# Patient Record
Sex: FEMALE | Race: WHITE | Employment: FULL TIME | URBAN - METROPOLITAN AREA
[De-identification: names, ages, dates, MRNs, and addresses within clinical notes are randomized per-mention and may not be internally consistent; named-entity substitution may affect disease eponyms.]

---

## 2021-01-03 ENCOUNTER — APPOINTMENT (OUTPATIENT)
Dept: CT IMAGING | Age: 56
End: 2021-01-03
Attending: EMERGENCY MEDICINE
Payer: COMMERCIAL

## 2021-01-03 ENCOUNTER — APPOINTMENT (OUTPATIENT)
Dept: GENERAL RADIOLOGY | Age: 56
End: 2021-01-03
Attending: EMERGENCY MEDICINE
Payer: COMMERCIAL

## 2021-01-03 ENCOUNTER — HOSPITAL ENCOUNTER (EMERGENCY)
Age: 56
Discharge: HOME | End: 2021-01-04
Attending: EMERGENCY MEDICINE | Admitting: EMERGENCY MEDICINE
Payer: COMMERCIAL

## 2021-01-03 DIAGNOSIS — S42.215A CLOSED NONDISPLACED FRACTURE OF SURGICAL NECK OF LEFT HUMERUS, UNSPECIFIED FRACTURE MORPHOLOGY, INITIAL ENCOUNTER: Primary | ICD-10-CM

## 2021-01-03 DIAGNOSIS — S01.112A LEFT EYELID LACERATION, INITIAL ENCOUNTER: ICD-10-CM

## 2021-01-03 DIAGNOSIS — Z23 NEED FOR TETANUS BOOSTER: ICD-10-CM

## 2021-01-03 DIAGNOSIS — M79.602 LEFT ARM PAIN: ICD-10-CM
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
## 2021-01-03 LAB
GLUCOSE BLD STRIP.AUTO-MCNC: 98 MG/DL (ref 65–100)
SERVICE CMNT-IMP: NORMAL

## 2021-01-03 PROCEDURE — 70486 CT MAXILLOFACIAL W/O DYE: CPT

## 2021-01-03 PROCEDURE — 82962 GLUCOSE BLOOD TEST: CPT

## 2021-01-03 PROCEDURE — 73030 X-RAY EXAM OF SHOULDER: CPT

## 2021-01-03 PROCEDURE — 70450 CT HEAD/BRAIN W/O DYE: CPT

## 2021-01-03 PROCEDURE — 99283 EMERGENCY DEPT VISIT LOW MDM: CPT

## 2021-01-03 PROCEDURE — 90471 IMMUNIZATION ADMIN: CPT

## 2021-01-03 PROCEDURE — 74011250637 HC RX REV CODE- 250/637: Performed by: EMERGENCY MEDICINE

## 2021-01-03 PROCEDURE — 72125 CT NECK SPINE W/O DYE: CPT

## 2021-01-03 PROCEDURE — 75810000293 HC SIMP/SUPERF WND  RPR

## 2021-01-03 PROCEDURE — 74011000250 HC RX REV CODE- 250: Performed by: EMERGENCY MEDICINE

## 2021-01-03 RX ORDER — LIDOCAINE HYDROCHLORIDE AND EPINEPHRINE 20; 5 MG/ML; UG/ML
1.5 INJECTION, SOLUTION EPIDURAL; INFILTRATION; INTRACAUDAL; PERINEURAL
Status: COMPLETED | OUTPATIENT
Start: 2021-01-03 | End: 2021-01-03

## 2021-01-03 RX ORDER — OXYCODONE AND ACETAMINOPHEN 5; 325 MG/1; MG/1
1 TABLET ORAL
Qty: 10 TAB | Refills: 0 | Status: SHIPPED | OUTPATIENT
Start: 2021-01-03 | End: 2021-01-04 | Stop reason: SDUPTHER

## 2021-01-03 RX ORDER — OXYCODONE AND ACETAMINOPHEN 5; 325 MG/1; MG/1
1 TABLET ORAL
Status: COMPLETED | OUTPATIENT
Start: 2021-01-03 | End: 2021-01-03

## 2021-01-03 RX ORDER — BACITRACIN 500 [USP'U]/G
OINTMENT TOPICAL 3 TIMES DAILY
Qty: 1 TUBE | Refills: 0 | Status: SHIPPED | OUTPATIENT
Start: 2021-01-03 | End: 2021-01-13

## 2021-01-03 RX ADMIN — OXYCODONE HYDROCHLORIDE AND ACETAMINOPHEN 1 TABLET: 5; 325 TABLET ORAL at 23:48

## 2021-01-03 RX ADMIN — LIDOCAINE HYDROCHLORIDE AND EPINEPHRINE 30 MG: 20; 5 INJECTION, SOLUTION EPIDURAL; INFILTRATION; INTRACAUDAL; PERINEURAL at 23:48

## 2021-01-03 NOTE — LETTER
[address] 
[facility] EMERGENCY DEPT 
[address] [place] [address] Misty Houser [id number] 
[phone] Work/School Note Date: 1/3/2021 To Whom It May concern: 
 
Vicente Deluca was seen and treated today in the emergency room by the following provider(s): 
Attending Provider: Petra Donahue MD.   
 
Vicente Deluca may return to work on 1/8/21. Sincerely, Tito Cisneros MD

## 2021-01-04 ENCOUNTER — OFFICE VISIT (OUTPATIENT)
Dept: ORTHOPEDIC SURGERY | Age: 56
End: 2021-01-04
Payer: COMMERCIAL

## 2021-01-04 VITALS — SYSTOLIC BLOOD PRESSURE: 123 MMHG | HEART RATE: 80 BPM | DIASTOLIC BLOOD PRESSURE: 78 MMHG | TEMPERATURE: 97.1 F

## 2021-01-04 VITALS
OXYGEN SATURATION: 99 % | RESPIRATION RATE: 18 BRPM | BODY MASS INDEX: 43.04 KG/M2 | HEIGHT: 61 IN | SYSTOLIC BLOOD PRESSURE: 128 MMHG | DIASTOLIC BLOOD PRESSURE: 73 MMHG | WEIGHT: 227.96 LBS | HEART RATE: 88 BPM | TEMPERATURE: 97.6 F

## 2021-01-04 DIAGNOSIS — S42.295A OTHER CLOSED NONDISPLACED FRACTURE OF PROXIMAL END OF LEFT HUMERUS, INITIAL ENCOUNTER: Primary | ICD-10-CM

## 2021-01-04 PROBLEM — S42.202A CLOSED FRACTURE OF LEFT PROXIMAL HUMERUS: Status: ACTIVE | Noted: 2021-01-04

## 2021-01-04 PROCEDURE — 75810000293 HC SIMP/SUPERF WND  RPR

## 2021-01-04 PROCEDURE — 74011250636 HC RX REV CODE- 250/636: Performed by: EMERGENCY MEDICINE

## 2021-01-04 PROCEDURE — 99203 OFFICE O/P NEW LOW 30 MIN: CPT | Performed by: ORTHOPAEDIC SURGERY

## 2021-01-04 PROCEDURE — 90715 TDAP VACCINE 7 YRS/> IM: CPT | Performed by: EMERGENCY MEDICINE

## 2021-01-04 RX ORDER — ONDANSETRON 4 MG/1
4 TABLET, ORALLY DISINTEGRATING ORAL
Qty: 20 TAB | Refills: 0 | Status: SHIPPED | OUTPATIENT
Start: 2021-01-04

## 2021-01-04 RX ORDER — OXYCODONE AND ACETAMINOPHEN 5; 325 MG/1; MG/1
1 TABLET ORAL
Qty: 15 TAB | Refills: 0 | Status: SHIPPED | OUTPATIENT
Start: 2021-01-04 | End: 2021-01-09

## 2021-01-04 RX ADMIN — TETANUS TOXOID, REDUCED DIPHTHERIA TOXOID AND ACELLULAR PERTUSSIS VACCINE, ADSORBED 0.5 ML: 5; 2.5; 8; 8; 2.5 SUSPENSION INTRAMUSCULAR at 00:19

## 2021-01-04 NOTE — ED PROVIDER NOTES
EMERGENCY DEPARTMENT HISTORY AND PHYSICAL EXAM      Please note that this dictation was completed with the assistance of \"Dragon\", the computer voice recognition software. Quite often unanticipated grammatical, syntax, homophones, and other interpretive errors are inadvertently transcribed by the computer software. Please disregard these errors and any errors that have escaped final proofreading. Thank you. Patient Name: Maria Rincon  : 1965  MRN: [id number]  History of Presenting Illness     Chief Complaint   Patient presents with    Fall     Pt reports she fell in bathroom-- hit head on sink--Pt reports left shoulder painDenies LOC. Denies being on any blood thinners.  Laceration     Pt has a laceration above left eye. Pts daughter reports Pt ha sbeen drinking tonight. History Provided By: Patient    HPI: Maria Rincon, 48 y.o. female with past medical history as documented below presents to the ED with c/o of low fall occurring earlier this evening. Patient states that she was walking to the bathroom when she slipped on a puddle of water and hit her head. Patient states that there was no loss of consciousness. Patient reports pain is mostly in her left shoulder. She states it is moderate to severe, worse with any movement. Patient also noticed a laceration above her left eye. She is unsure of her last tetanus. She states there was bleeding initially that was controlled with pressure. Patient admits to drinking earlier this evening. Patient denies any preceding symptoms of chest pain, shortness of breath or palpitations. Pt denies any other alleviating or exacerbating factors. Additionally, pt specifically denies any recent fever, chills, headache, nausea, vomiting, abdominal pain, CP, SOB, lightheadedness, dizziness, numbness, weakness, lower extremity swelling, heart palpitations, urinary sxs, diarrhea, constipation, melena, hematochezia, cough, or congestion.      There are no other complaints, changes or physical findings pertinent to the HPI at this time. PCP: None    Past History   Past Medical History:  Past Medical History:   Diagnosis Date    Abdominal pain     Fatigue     Hernia of abdominal cavity     incisional    Musculoskeletal disorder     muscle aches    Vertigo        Past Surgical History:  Past Surgical History:   Procedure Laterality Date    ABDOMEN SURGERY PROC UNLISTED      gastric bypass    HX GASTRIC BYPASS      gasstric bypass  /  gall bladder removed       Family History:  Family History   Problem Relation Age of Onset    Heart Disease Father        Social History:  Social History     Tobacco Use    Smoking status: Former Smoker     Quit date: 2015     Years since quittin.0   Substance Use Topics    Alcohol use: No    Drug use: Yes     Types: Prescription, OTC       Allergies:  No Known Allergies    Current Medications:  No current facility-administered medications on file prior to encounter. Current Outpatient Medications on File Prior to Encounter   Medication Sig Dispense Refill    oxyCODONE-acetaminophen (PERCOCET) 5-325 mg per tablet Take 1-2 Tabs by mouth every four (4) hours as needed for Pain. Max Daily Amount: 12 Tabs. 50 Tab 0     Review of Systems   Review of Systems   Constitutional: Negative. Negative for chills and fever. HENT: Negative. Negative for congestion, facial swelling, rhinorrhea, sore throat, trouble swallowing and voice change. Eyes: Negative. Respiratory: Negative. Negative for apnea, cough, chest tightness, shortness of breath and wheezing. Cardiovascular: Negative. Negative for chest pain, palpitations and leg swelling. Gastrointestinal: Negative. Negative for abdominal distention, abdominal pain, blood in stool, constipation, diarrhea, nausea and vomiting. Endocrine: Negative. Negative for cold intolerance, heat intolerance and polyuria. Genitourinary: Negative.   Negative for difficulty urinating, dysuria, flank pain, frequency, hematuria and urgency. Musculoskeletal: Positive for arthralgias. Negative for back pain, myalgias, neck pain and neck stiffness. Skin: Positive for wound. Negative for color change and rash. Neurological: Negative. Negative for dizziness, syncope, facial asymmetry, speech difficulty, weakness, light-headedness, numbness and headaches. Hematological: Negative. Does not bruise/bleed easily. Psychiatric/Behavioral: Negative. Negative for confusion and self-injury. The patient is not nervous/anxious. Physical Exam   Physical Exam  Vitals signs and nursing note reviewed. Constitutional:       General: She is not in acute distress. Appearance: She is well-developed. She is not diaphoretic. HENT:      Head: Normocephalic and atraumatic. Comments: Approximately 3 cm linear laceration noted to the left upper brow, no involvement of the canicular system, no periorbital edema or ecchymosis. No septal hematoma, midface is stable, no malocclusion     Mouth/Throat:      Pharynx: No oropharyngeal exudate. Eyes:      Conjunctiva/sclera: Conjunctivae normal.      Pupils: Pupils are equal, round, and reactive to light. Neck:      Musculoskeletal: Normal range of motion. Cardiovascular:      Rate and Rhythm: Normal rate and regular rhythm. Heart sounds: Normal heart sounds. No murmur. No friction rub. No gallop. Pulmonary:      Effort: Pulmonary effort is normal. No respiratory distress. Breath sounds: Normal breath sounds. No wheezing or rales. Chest:      Chest wall: No tenderness. Abdominal:      General: Bowel sounds are normal. There is no distension. Palpations: Abdomen is soft. There is no mass. Tenderness: There is no abdominal tenderness. There is no guarding or rebound. Musculoskeletal: Normal range of motion.          General: Tenderness (Tenderness to palpation of the left shoulder and left proximal humerus, neurovascular intact distally. No tenderness over the clavicle,) present. No deformity. Skin:     General: Skin is warm. Findings: No rash. Neurological:      Mental Status: She is alert and oriented to person, place, and time. Cranial Nerves: No cranial nerve deficit. Motor: No abnormal muscle tone. Coordination: Coordination normal.      Deep Tendon Reflexes: Reflexes normal.         Diagnostic Study Results     Labs -   I have personally reviewed and interpreted all laboratory results. Recent Results (from the past 24 hour(s))   GLUCOSE, POC    Collection Time: 01/03/21 11:42 PM   Result Value Ref Range    Glucose (POC) 98 65 - 100 mg/dL    Performed by Rhonda Giordano (PCT)        Radiologic Studies -   I have personally reviewed and interpreted all imaging studies and agree with radiology interpretation and report. XR SHOULDER LT AP/LAT MIN 2 V   Final Result   IMPRESSION: Acute nondisplaced left humeral neck fracture. CT HEAD WO CONT   Final Result   IMPRESSION:       No acute intracranial abnormality. CT MAXILLOFACIAL WO CONT   Final Result   IMPRESSION:    No acute abnormality. CT SPINE CERV WO CONT   Final Result   IMPRESSION:    No acute abnormality. CT Results  (Last 48 hours)               01/03/21 2208  CT HEAD WO CONT Final result    Impression:  IMPRESSION:        No acute intracranial abnormality. Narrative:  EXAM:  CT head without contrast       INDICATION: Fall with head injury. COMPARISON: None       TECHNIQUE: Noncontrast head CT. Coronal and sagittal reformats. CT dose   reduction was achieved through use of a standardized protocol tailored for this   examination and automatic exposure control for dose modulation. FINDINGS: The ventricles and sulci are age-appropriate without hydrocephalus. There is no mass effect or midline shift. There is no intracranial hemorrhage or   extra-axial fluid collection. There is no abnormal parenchymal attenuation. The   gray-white matter differentiation is maintained. The basal cisterns are patent. The osseous structures are intact. The visualized paranasal sinuses and mastoid   air cells are clear. 01/03/21 2208  CT MAXILLOFACIAL WO CONT Final result    Impression:  IMPRESSION:    No acute abnormality. Narrative:  EXAM:  CT maxillofacial without contrast       INDICATION:  Fall with facial injury. COMPARISON: None       TECHNIQUE: Helical CT of the facial bones with coronal and sagittal reformats. Images reviewed in soft tissue and bone windows. CT dose reduction was achieved   through use of a standardized protocol tailored for this examination and   automatic exposure control for dose modulation. CONTRAST: None. FINDINGS:    There is no acute fracture. The nasal septum is midline. The temporomandibular   joints are intact. There is mild mucosal thickening in the right anterior ethmoid air cells. The   remaining paranasal sinuses and visualized mastoid air cells are clear. Limited intracranial images are unremarkable. The globes and orbits are   symmetric and within normal limits. 01/03/21 2208  CT SPINE CERV WO CONT Final result    Impression:  IMPRESSION:    No acute abnormality. Narrative:  EXAM:  CT C-spine without contrast       INDICATION: Fall with head injury. COMPARISON: None. TECHNIQUE: Thin section axial noncontrast CT of the cervical spine with coronal   and sagittal reformats. CT dose reduction was achieved through use of a   standardized protocol tailored for this examination and automatic exposure   control for dose modulation. FINDINGS:    There is no acute fracture or subluxation. Vertebral body heights and   intervertebral disc spaces are maintained. There is no abnormality in alignment. There is no spinal canal or foraminal stenosis.  The paraspinal soft tissues are unremarkable. The visualized lung apices are clear. CXR Results  (Last 48 hours)    None          Medical Decision Making   I reviewed the vital signs, available nursing notes, past medical history, past surgical history, family history and social history. Vital Signs-Reviewed the patient's vital signs. Patient Vitals for the past 24 hrs:   Temp Pulse Resp BP SpO2   01/04/21 0015  88 18 128/73 99 %   01/03/21 2043 97.6 °F (36.4 °C) 84 18 138/86 99 %       Pulse Oximetry Analysis - 99% on RA    Cardiac Monitor:   Rate: 88 bpm  Rhythm: Normal Sinus Rhythm      Records Reviewed: Nursing Notes, Old Medical Records, Previous electrocardiograms, Previous Radiology Studies and Previous Laboratory Studies    Provider Notes (Medical Decision Making):   Patient presents after fall with left shoulder pain. Will get imaging to further evaluate for fracture vs. Dislocation vs. Contusion. Will treat with analgesics at this time and continue to monitor for changes in mentation. I have discussed with the patient how to reduce likelihood of falling at home by removing throw rugs, loose wires or other objects from floor, installing hand-rails in bathrooms, tubs and halls, and leaving some lights on at night. Pt presents with resultant laceration of her left eyebrow requiring simple bedside repair. TDAP updated. NVI distally. Relatively clean wound, irrigated copiously and repaired in simple fashion with Dermabond and steristrips. See procedure note below. No antibiotics indicated at this time. NVI per routine post repair. No overt e/o compartment syndrome. Discussed strict return precautions, follow up PRN and wound care. Pt understands and agrees with above plan. ED Course:   I am the first provider for this patient's ED visit today. Initial assessment performed.  I discussed presenting problems, concerns and my formulated plan for today's visit with the patient and any available family members at bedside. I encouraged them to ask questions as they arise throughout the visit. ALCOHOL/SUBSTANCE ABUSE COUNSELING:  Upon evaluation, pt endorsed recent alcohol/illicit drug use. For approximately 7 minutes, pt has been counseled on the dangers of alcohol and illicit drug use on their health, and they were encouraged to quit as soon as possible in order to decrease further risks to their health. Pt has conveyed their understanding of the risks involved should they continue to use these products. I reviewed our electronic medical record system for any past medical records that were available that may contribute to the patient's current condition, the nursing notes and vital signs from today's visit. Misti Carney MD    ED Orders Placed :  Orders Placed This Encounter    WOUND REPAIR    XR SHOULDER LT AP/LAT MIN 2 V    CT HEAD WO CONT    CT MAXILLOFACIAL WO CONT    CT SPINE CERV WO CONT    GLUCOSE, POC    DURABLE MEDICAL EQUIPMENT     oxyCODONE-acetaminophen (PERCOCET) 5-325 mg per tablet 1 Tab    lidocaine-EPINEPHrine (PF) (XYLOCAINE) 2 %-1:200,000 injection 30 mg    DISCONTD: oxyCODONE-acetaminophen (Percocet) 5-325 mg per tablet    bacitracin (BACITRACIN) 500 unit/gram oint    diph,Pertuss(AC),Tet Vac-PF (BOOSTRIX) suspension 0.5 mL    oxyCODONE-acetaminophen (Percocet) 5-325 mg per tablet       ED Medications Administered:  Medications   oxyCODONE-acetaminophen (PERCOCET) 5-325 mg per tablet 1 Tab (1 Tab Oral Given 1/3/21 2348)   lidocaine-EPINEPHrine (PF) (XYLOCAINE) 2 %-1:200,000 injection 30 mg (30 mg IntraDERMal Given 1/3/21 2348)   diph,Pertuss(AC),Tet Vac-PF (BOOSTRIX) suspension 0.5 mL (0.5 mL IntraMUSCular Given 1/4/21 0019)         Procedure Note - Laceration Repair:  12:00 AM  Procedure by Millicent Griffin MD.  Complexity: simple  3 cm linear laceration to left upper brow was irrigated copiously with NS under jet lavage, prepped with Chlorprep and draped in a sterile fashion.   The area was anesthetized via local infiltration of 1 mL lidocaine 1% without epinephrine. The wound was explored with the following results: No foreign bodies found, No tendon laceration seen. The wound was repaired with Dermabond and steri-strips. The wound was closed with good hemostasis and approximation. Sterile dressing applied. Estimated blood loss: none  The procedure took 1-15 minutes, and pt tolerated well. Procedure Note - Sling and Swathe Splint Placement:  Performed by: Pema Thornton MD  Neurovascularly intact prior to tx. A shoulder sling and swathe splint was placed on pt's left shoulder/ arm. Neurovascularly intact after tx. The procedure took 5-10 minutes, and pt tolerated well. Progress Note:  Patient has been reassessed and reports feeling better and symptoms have improved significantly after ED treatment. Fransico Kaplan's final labs and imaging have been reviewed with her and available family and/or caregiver. They have been counseled regarding her diagnosis. She verbally conveys understanding and agreement of the signs, symptoms, diagnosis, treatment and prognosis and additionally agrees to follow up as recommended with Dr. None and/or specialist in 24 - 48 hours. She also agrees with the care-plan we created together and conveys that all of her questions have been answered. I have also put together some discharge instructions for her that include: 1) educational information regarding their diagnosis, 2) how to care for their diagnosis at home, as well a 3) list of reasons why they would want to return to the ED prior to their follow-up appointment should the patient's condition change or symptoms worsen. I have answered all questions to the patient's satisfaction. Strict return precautions given. She both understood and agreed with plan as discussed. Vital signs stable for discharge. Pt very appreciative of care today. Disposition:   DISCHARGE  The pt is ready for discharge.  The pt's signs, symptoms, diagnosis, and discharge instructions have been discussed and pt has conveyed their understanding. The pt is to follow up as recommended or return to ER should their symptoms worsen. Plan has been discussed and pt is in agreement. Plan:  1. Return precautions as discussed with patient and available family and/or caregiver. 2.   Discharge Medication List as of 1/3/2021 11:45 PM      START taking these medications    Details   bacitracin (BACITRACIN) 500 unit/gram oint Apply  to affected area three (3) times daily for 10 days. Apply to affected area, Normal, Disp-1 Tube, R-0      !! oxyCODONE-acetaminophen (Percocet) 5-325 mg per tablet Take 1 Tab by mouth every eight (8) hours as needed for Pain for up to 5 days. Max Daily Amount: 3 Tabs. Indications: pain, Normal, Disp-10 Tab, R-0       !! - Potential duplicate medications found. Please discuss with provider. CONTINUE these medications which have NOT CHANGED    Details   !! oxyCODONE-acetaminophen (PERCOCET) 5-325 mg per tablet Take 1-2 Tabs by mouth every four (4) hours as needed for Pain. Max Daily Amount: 12 Tabs., Print, Disp-50 Tab, R-0       !! - Potential duplicate medications found. Please discuss with provider. 3.   Follow-up Information     Follow up With Specialties Details Why Contact Info    [facility] EMERGENCY DEPT Emergency Medicine  As needed, If symptoms worsen [address] [place] [address]  [phone]    Skinny Avery MD Orthopedic Surgery  As needed, If symptoms worsen [address] [facility]  [address]  [address] [address] [phone]            Instructed to return to ED if worse  Diagnosis   Clinical Impression:  1. Closed nondisplaced fracture of surgical neck of left humerus, unspecified fracture morphology, initial encounter    2. Fall from ground level    3. Left eyelid laceration, initial encounter    4. Left arm pain    5.  Need for tetanus booster        Attestation:  Do Rooney Cassandra Shrestha MD, am the attending of record for this patient. I personally performed the services described in this documentation on this date, 1/3/2021 for patient, Jamie Mcneill. I have reviewed the chart and verified that the record is accurate and complete.

## 2021-01-04 NOTE — LETTER
1/4/2021 3:36 PM 
 
Ms. Ann Clemons [place] [address] To whom it may concern, Ann Clemons is under the care of [facility]. Ann Clemons will be released to work on 1/6/2021 with the following restrictions: no use left arm. If you have any questions or concerns, please feel free to contact my office.  
 
 
 
 
 
 
Sincerely, 
 
 
Pratik Hernandez, DO

## 2021-01-04 NOTE — ED NOTES
Post Fall Documentation      [place] witnessed/unwitnessed fall occurred on 1/3/21 (Date) at 0 (Time). The answers to the following questions summarize the fall: In the patient's own words,:  · What were you attempting to do when you fell? Go to the bathroom  · Do you know why you fell? I got dizzy  · Do you have any pain/discomfort or any other complaints? Not due to the fall  · Which part of your body made contact with the floor or other object? Buttocks/back    Nurse:  Renny Hernandez Was this an assisted fall? no   Was fall witnessed? No   If witnessed, what part of the body made contact with the floor or other object?  unwitnessed   Patients mental status after the fall/when found: Alert and oriented   Any apparent injury:  No apparent injury   Immediate interventions for injury/suspected injury? No interventions needed   Patient assisted back to bed? Assist X2   Name of provider notified and time, any comments? Mistyvernon Marc [phone]       Renny Hernandez Name of family member notified and time: Daugher 0      Immediate VS and physical assessment documented in flow sheets. Neuro assessment every hour x 4 (for potential head injury or unwitnessed fall) documented in flow sheets.       Jay Thomas, RN

## 2021-01-04 NOTE — ED NOTES
Bedside and Verbal shift change report given to [employer] (oncoming nurse) by Harish Huerta (offgoing nurse). Report included the following information SBAR, ED Summary, MAR and Recent Results.

## 2021-01-05 NOTE — PROGRESS NOTES
1/4/2021      CC: Left shoulder pain    HPI:      This is a 48y.o. year old female who is right hand dominant. The patient did have a fall onto the left side. The patient had the immediate onset of pain as well as difficulty moving the shoulder. Complains of pain in shoulder with ecchymosis and swelling. No numbness in hand or pain radiating into the hand. Does not complain of any open wounds. No loss of consciousness. No other musculoskeletal complaints. PMH:  Past Medical History:   Diagnosis Date    Abdominal pain     Fatigue     Hernia of abdominal cavity     incisional    Musculoskeletal disorder     muscle aches    Vertigo        PSxHx:  Past Surgical History:   Procedure Laterality Date    ABDOMEN SURGERY PROC UNLISTED  2002    gastric bypass    HX GASTRIC BYPASS      gasstric bypass  /  gall bladder removed       Meds:    Current Outpatient Medications:     oxyCODONE-acetaminophen (Percocet) 5-325 mg per tablet, Take 1 Tab by mouth every eight (8) hours as needed for Pain for up to 5 days. Max Daily Amount: 3 Tabs. Indications: pain, Disp: 15 Tab, Rfl: 0    ondansetron (Zofran ODT) 4 mg disintegrating tablet, Take 1 Tab by mouth every eight (8) hours as needed for Nausea or Vomiting for up to 20 doses. , Disp: 20 Tab, Rfl: 0    bacitracin (BACITRACIN) 500 unit/gram oint, Apply  to affected area three (3) times daily for 10 days. Apply to affected area, Disp: 1 Tube, Rfl: 0    oxyCODONE-acetaminophen (PERCOCET) 5-325 mg per tablet, Take 1-2 Tabs by mouth every four (4) hours as needed for Pain. Max Daily Amount: 12 Tabs., Disp: 50 Tab, Rfl: 0  No current facility-administered medications for this visit.      All:  No Known Allergies    Social Hx:  Social History     Socioeconomic History    Marital status: SINGLE     Spouse name: Not on file    Number of children: Not on file    Years of education: Not on file    Highest education level: Not on file   Tobacco Use    Smoking status: Former Smoker     Quit date: 2015     Years since quittin.0   Substance and Sexual Activity    Alcohol use: No    Drug use: Yes     Types: Prescription, OTC       Family Hx:  Family History   Problem Relation Age of Onset    Heart Disease Father          Review of Systems:       General: Denies headache, lethargy, fever, weight loss  Ears/Nose/Throat: Denies ear discharge, drainage, nosebleeds, hoarse voice, dental problems  Cardiovascular: Denies chest pain, shortness of breath  Lungs: Denies chest pain, breathing problems, wheezing, pneumonia  Stomach: Denies stomach pain, heartburn, constipation, irritable bowel  Skin: Denies rash, sores, open wounds  Musculoskeletal: Left shoulder pain  Genitourinary: Denies dysuria, hematuria, polyuria  Gastrointestinal: Denies constipation, obstipation, diarrhea  Neurological: Denies changes in sight, smell, hearing, taste, seizures. Denies loss of consciousness. Psychiatric: Denies depression, sleep pattern changes, anxiety, change in personality  Endocrine: Denies mood swings, heat or cold intolerance  Hematologic/Lymphatic: Denies anemia, purpura, petechia  Allergic/Immunologic: Denies swelling of throat, pain or swelling at lymph nodes      Physical Examination:    There were no vitals taken for this visit. General: AOX3, no apparent distress  Psychiatric: mood and affect appropriate  Lungs: breathing is symmetric and unlabored bilaterally  Heart: regular rate and rhythm  Abdomen: no guarding  Head: normocephalic, atraumatic  Skin: No significant abnormalities, good turgor  Sensation intact to light touch: C5-T1 dermatomes  Muscular exam: 5/5 strength in all major muscle groups unless noted in specialty exam.    Extremities      Right upper extremity: Full active and passive range of motion without pain, deformity, no open wound, strength 5/5 in all major muscle groups. Left upper extremity: No gross deformity or open wound.  Swelling is noted at the shoulder laterally with associated ecchymosis. Range of motion is limited secondary to pain at the shoulder. Elbow motion is limited by pain at the shoulder, but there is no tenderness. Fingers do move independently and formal strength testing was not performed secondary to pain at the shoulder, but no indication of tendinous deficit. Sensation is intact to light touch in the median, radial, and ulnar distributions. Capillary refill is less than 2 seconds in the fingers. Wrist is non-tender to palpation. Right lower extremity: Full active and passive range of motion without pain, deformity, no open wound, strength 5/5 in all major muscle groups. Left lower extremity:  Full active and passive range of motion without pain, deformity, no open wound, strength 5/5 in all major muscle groups. Diagnostics:    Pertinent Xrays:  Xrays are available of the left shoulder which indicate an extra-articular proximal humerus fracture. There are 2 parts. No other abnormalities. Assessment: Left extra-articular proximal humerus fracture    Plan:    In consideration of the nature of the fracture, pace of healing, reliability of good outcome, age of the patient and informed decision of the patient, we did discuss that this is a fracture which can be treated in a nonoperative fasion. We did discuss that there are risks to this particular type of treatment. Stiffness was discussed at length, and that outcomes are largely the same as surgery in this population. There is morbidity of immobilization, prolonged recovery, need for close follow up, potential for later and unacceptable displacement requiring surgery, stiffness, swelling, persistent pain, need for therapy, and skin issues. The patient did freely state their understanding and agreement.     The patient will be given an immobilizer for comfort, but gentle and directed range of motion exercises will begin as soon as pain allows, likely in a week.  I have warned that stiffness is one of the most common issues after these fractures, and early progressive motion is  essential for a good outcome. Patient will use tramadol and a sling applied in office today for pain control. Follow up will be in 1 weeks with xrays of the left shoulder. Ms. Alex Amador has a reminder for a \"due or due soon\" health maintenance. I have asked that she contact her primary care provider for follow-up on this health maintenance.

## 2021-01-11 DIAGNOSIS — S42.295A OTHER CLOSED NONDISPLACED FRACTURE OF PROXIMAL END OF LEFT HUMERUS, INITIAL ENCOUNTER: Primary | ICD-10-CM

## 2021-01-12 ENCOUNTER — TELEPHONE (OUTPATIENT)
Dept: ORTHOPEDIC SURGERY | Age: 56
End: 2021-01-12

## 2021-01-12 NOTE — TELEPHONE ENCOUNTER
Pt called stating she is worried she has a \"blood clot\". Pt said on her LT forearm there is a bubble the size of a \"butter bean\" and it hurts to touch. She denies any redness and isn't warm to touch. She said it's been there x3 days, but 2 days ago it made her 3 fingers go numb, but her fingers are fine today. Pt is coming in on Thursday 1/14/21 and getting XR as well. Please advise. Yes

## 2021-01-13 ENCOUNTER — DOCUMENTATION ONLY (OUTPATIENT)
Dept: ORTHOPEDIC SURGERY | Age: 56
End: 2021-01-13

## 2021-01-13 NOTE — PROGRESS NOTES
Called pt to make sure she was alright as she was an hour late for her XR. Pt stated that XR told her it would cost for her to get an XR done and she wasn't going to pay it, so she left. I offered for her to go to [facility] where they wouldn't make her pay a large amount up front and pt wanted to know what the cost would be that she gets billed for, I told her I didn't know, I don't do billing, and she stated she wasn't paying anything, so she wasn't going to worry about any appts. She asked what she would need to pay for out office visit, I again stated I wasn't sure, it depends on her insurance, but she could get billed and pt said she wasn't paying for anything, stated her injury was doing better, she was still in some pain, but she wouldn't be fine and not worry about anymore appointments. I informed her I would let Dr. Lonnie Odom know and see what we could do from here and if she changes her mind or needs anything in the mean time to call our office.
